# Patient Record
Sex: MALE | Race: WHITE | NOT HISPANIC OR LATINO | Employment: OTHER | URBAN - METROPOLITAN AREA
[De-identification: names, ages, dates, MRNs, and addresses within clinical notes are randomized per-mention and may not be internally consistent; named-entity substitution may affect disease eponyms.]

---

## 2017-06-20 ENCOUNTER — OFFICE VISIT (OUTPATIENT)
Dept: ORTHOPEDIC SURGERY | Facility: CLINIC | Age: 82
End: 2017-06-20

## 2017-06-20 VITALS — TEMPERATURE: 98.1 F | WEIGHT: 175.6 LBS | BODY MASS INDEX: 26.01 KG/M2 | HEIGHT: 69 IN

## 2017-06-20 DIAGNOSIS — M25.561 CHRONIC PAIN OF BOTH KNEES: Primary | ICD-10-CM

## 2017-06-20 DIAGNOSIS — M25.552 LEFT HIP PAIN: ICD-10-CM

## 2017-06-20 DIAGNOSIS — G89.29 CHRONIC PAIN OF BOTH KNEES: Primary | ICD-10-CM

## 2017-06-20 DIAGNOSIS — M25.562 CHRONIC PAIN OF BOTH KNEES: Primary | ICD-10-CM

## 2017-06-20 PROCEDURE — 73502 X-RAY EXAM HIP UNI 2-3 VIEWS: CPT | Performed by: ORTHOPAEDIC SURGERY

## 2017-06-20 PROCEDURE — 20610 DRAIN/INJ JOINT/BURSA W/O US: CPT | Performed by: ORTHOPAEDIC SURGERY

## 2017-06-20 PROCEDURE — 73562 X-RAY EXAM OF KNEE 3: CPT | Performed by: ORTHOPAEDIC SURGERY

## 2017-06-20 PROCEDURE — 99203 OFFICE O/P NEW LOW 30 MIN: CPT | Performed by: ORTHOPAEDIC SURGERY

## 2017-06-20 RX ORDER — BUPIVACAINE HYDROCHLORIDE 5 MG/ML
2 INJECTION, SOLUTION PERINEURAL
Status: COMPLETED | OUTPATIENT
Start: 2017-06-20 | End: 2017-06-20

## 2017-06-20 RX ORDER — LIDOCAINE HYDROCHLORIDE 10 MG/ML
4 INJECTION, SOLUTION INFILTRATION; PERINEURAL
Status: COMPLETED | OUTPATIENT
Start: 2017-06-20 | End: 2017-06-20

## 2017-06-20 RX ORDER — AMILORIDE HYDROCHLORIDE AND HYDROCHLOROTHIAZIDE 5; 50 MG/1; MG/1
1 TABLET ORAL
COMMUNITY

## 2017-06-20 RX ORDER — POTASSIUM CHLORIDE 750 MG/1
10 TABLET, FILM COATED, EXTENDED RELEASE ORAL
COMMUNITY

## 2017-06-20 RX ORDER — METHYLPREDNISOLONE ACETATE 80 MG/ML
80 INJECTION, SUSPENSION INTRA-ARTICULAR; INTRALESIONAL; INTRAMUSCULAR; SOFT TISSUE
Status: COMPLETED | OUTPATIENT
Start: 2017-06-20 | End: 2017-06-20

## 2017-06-20 RX ADMIN — METHYLPREDNISOLONE ACETATE 80 MG: 80 INJECTION, SUSPENSION INTRA-ARTICULAR; INTRALESIONAL; INTRAMUSCULAR; SOFT TISSUE at 14:35

## 2017-06-20 RX ADMIN — LIDOCAINE HYDROCHLORIDE 4 ML: 10 INJECTION, SOLUTION INFILTRATION; PERINEURAL at 14:35

## 2017-06-20 RX ADMIN — BUPIVACAINE HYDROCHLORIDE 2 ML: 5 INJECTION, SOLUTION PERINEURAL at 14:05

## 2017-06-20 RX ADMIN — LIDOCAINE HYDROCHLORIDE 4 ML: 10 INJECTION, SOLUTION INFILTRATION; PERINEURAL at 14:05

## 2017-06-20 RX ADMIN — METHYLPREDNISOLONE ACETATE 80 MG: 80 INJECTION, SUSPENSION INTRA-ARTICULAR; INTRALESIONAL; INTRAMUSCULAR; SOFT TISSUE at 14:05

## 2017-06-20 RX ADMIN — BUPIVACAINE HYDROCHLORIDE 2 ML: 5 INJECTION, SOLUTION PERINEURAL at 14:35

## 2017-06-20 NOTE — PROGRESS NOTES
"Patient: Dayton Woo  YOB: 1929 88 y.o. male  Medical Record Number: [id number]    Chief Complaints:   Chief Complaint   Patient presents with   • Left Knee - Pain   • Right Knee - Pain   • Left Hip - Pain       History of Present Illness:Dayton Woo is a 88 y.o. male who presents with Bilateral knee pain.  He describes a moderate to moderately severe ache.  It's worse with weightbearing.  It has been ongoing for years but much worse over the last year or so. Mild left groin pain.     Allergies: No Known Allergies    Medications:   Current Outpatient Prescriptions   Medication Sig Dispense Refill   • aMILoride-hydrochlorothiazide (MODURETIC) 5-50 MG per tablet Take 1 tablet by mouth.     • potassium chloride (K-DUR) 10 MEQ CR tablet Take 10 mEq by mouth.       No current facility-administered medications for this visit.          The following portions of the patient's history were reviewed and updated as appropriate: allergies, current medications, past family history, past medical history, past social history, past surgical history and problem list.    Review of Systems:   A 14 point review of systems was performed. All systems negative except pertinent positives/negative listed in HPI above    Physical Exam:   Vitals:    06/20/17 1351   Temp: 98.1 °F (36.7 °C)   Weight: 175 lb 9.6 oz (79.7 kg)   Height: 69\" (175.3 cm)       General: A and O x 3, ASA, NAD    SCLERA:    Normal    DENTITION:   Normal  Knee:  bilateral    ALIGNMENT:     Valgus      GAIT:    Antalgic    SKIN:    No abnormality    RANGE OF MOTION:   3  -  120   DEG    STRENGTH:   4  / 5    LIGAMENTS:    No varus / valgus instability.   Negative  Lachman.    MENISCUS:     Negative   Marin       DISTAL PULSES:    Paplable    DISTAL SENSATION :   Intact    LYMPHATICS:     No   lymphadenopathy    OTHER:          - Positive   effusion      - Crepitance with ROM        Radiology:  Xrays 3views both knees (ap,lateral, sunrise) were " ordered and reviewed for evaluation of knee pain demonstrating advanced valgus osteoarthritis with bone on bone articulation, subchondral cysts, and periarticular osteophytes. There are no previous films for comparision.     x-rays 2 views AP and lateral of the left hip were ordered and reviewed for evaluation f hp pain which show mild arthritic change.  There are no previous films or comparison    Assessment/Plan: Bilateral knee osteoarthritis.  He and I both would like to avoid surgery.  We will try with conservative measures.  I injected both knees as below.  Back on a when necessary basis.  I will send him to outpatient physical therapist.  Mild left hip oteoarthritis we'll continu tomonitor  Large Joint Arthrocentesis  Date/Time: 6/20/2017 2:05 PM  Consent given by: patient  Site marked: site marked  Timeout: Immediately prior to procedure a time out was called to verify the correct patient, procedure, equipment, support staff and site/side marked as required   Supporting Documentation  Indications: pain and joint swelling   Procedure Details  Location: knee - R knee  Preparation: Patient was prepped and draped in the usual sterile fashion  Needle size: 18 G  Approach: anterolateral  Medications administered: 4 mL lidocaine 1 %; 80 mg methylPREDNISolone acetate 80 MG/ML; 2 mL bupivacaine      Large Joint Arthrocentesis  Date/Time: 6/20/2017 2:35 PM  Consent given by: patient  Site marked: site marked  Timeout: Immediately prior to procedure a time out was called to verify the correct patient, procedure, equipment, support staff and site/side marked as required   Supporting Documentation  Indications: pain and joint swelling   Procedure Details  Location: knee - L knee  Preparation: Patient was prepped and draped in the usual sterile fashion  Needle size: 18 G  Approach: anterolateral  Medications administered: 2 mL bupivacaine; 4 mL lidocaine 1 %; 80 mg methylPREDNISolone acetate 80 MG/ML                Bj BUSTOS  MD Jamshid  6/20/2017

## 2019-01-08 ENCOUNTER — OFFICE VISIT (OUTPATIENT)
Dept: ORTHOPEDIC SURGERY | Facility: CLINIC | Age: 84
End: 2019-01-08

## 2019-01-08 VITALS — HEIGHT: 69 IN | WEIGHT: 178 LBS | TEMPERATURE: 97.6 F | BODY MASS INDEX: 26.36 KG/M2

## 2019-01-08 DIAGNOSIS — G89.29 CHRONIC PAIN OF BOTH KNEES: Primary | ICD-10-CM

## 2019-01-08 DIAGNOSIS — M25.562 CHRONIC PAIN OF BOTH KNEES: Primary | ICD-10-CM

## 2019-01-08 DIAGNOSIS — M25.561 CHRONIC PAIN OF BOTH KNEES: Primary | ICD-10-CM

## 2019-01-08 PROCEDURE — 73562 X-RAY EXAM OF KNEE 3: CPT | Performed by: ORTHOPAEDIC SURGERY

## 2019-01-08 PROCEDURE — 99213 OFFICE O/P EST LOW 20 MIN: CPT | Performed by: ORTHOPAEDIC SURGERY

## 2019-01-08 NOTE — PROGRESS NOTES
"Patient: Dayton Woo  YOB: 1929 89 y.o. male  Medical Record Number: [id number]    Chief Complaints:   Chief Complaint   Patient presents with   • Right Knee - Follow-up       History of Present Illness:Dayton Woo is a 89 y.o. male who presents for follow-up of  right knee.  He has known advanced arthritis he doesn't complain of much pain but he does have occasional feelings of instability.  He says it is not that bad.  He had injections 3 years or so ago.  And overall it helped for a short period of time.  He states his knee is not bad but is here with his wife he says he is very limited in his ambulatory ability.  He has no evidence of dementia that he or she are aware of  Allergies:   Allergies   Allergen Reactions   • Ciprofloxacin Unknown (See Comments)   • Sulfa Antibiotics Unknown (See Comments)       Medications:   Current Outpatient Medications   Medication Sig Dispense Refill   • potassium chloride (K-DUR) 10 MEQ CR tablet Take 10 mEq by mouth.     • aMILoride-hydrochlorothiazide (MODURETIC) 5-50 MG per tablet Take 1 tablet by mouth.       No current facility-administered medications for this visit.          The following portions of the patient's history were reviewed and updated as appropriate: allergies, current medications, past family history, past medical history, past social history, past surgical history and problem list.    Review of Systems:   A 14 point review of systems was performed. All systems negative except pertinent positives/negative listed in HPI above    Physical Exam:   Vitals:    01/08/19 1329   Temp: 97.6 °F (36.4 °C)   TempSrc: Temporal   Weight: 80.7 kg (178 lb)   Height: 175.3 cm (69\")       General: A and O x 3, ASA, NAD    SCLERA:    Normal    DENTITION:   Normal  Knee:  right    ALIGNMENT:     Valgus      GAIT:    Antalgic    SKIN:    No abnormality    RANGE OF MOTION:   4  -  115   DEG    STRENGTH:   4  / 5    LIGAMENTS:    No varus / valgus instability. "   Negative  Lachman.    MENISCUS:     Negative   Marin       DISTAL PULSES:    Paplable    DISTAL SENSATION :   Intact    LYMPHATICS:     No   lymphadenopathy    OTHER:          - Positive   effusion      - Crepitance with ROM     Radiology:  Xrays 3views both knees  (ap,lateral, sunrise) were ordered and reviewed for evaluation of knee pain demonstratingadvanced valgus osteoarthritis with bone on bone articulation, subchondral cysts, and periarticular osteophytes  todays xrays were compared to previous xrays and show new findings as described above    Assessment/Plan:  Advanced right great and left knee valgus osteoarthritis pain is not limiting him at this point.  We discussed options including physical therapy cortisone or gel injections but this point he would like to hold off for recommended continued quad strengthening exercises.  I'll see him back as needed

## 2019-01-08 NOTE — PROGRESS NOTES
"Patient: Dayton Woo  YOB: 1929 89 y.o. male  Medical Record Number: [id number]    Chief Complaints:   Chief Complaint   Patient presents with   • Right Knee - Follow-up       History of Present Illness:Dayton Woo is a 89 y.o. male who presents for follow-up of  both knees.  He has severe advanced right greater than left end-stage valgus osteoarthritis is not having pain.  He's here with his wife states he mostly has issues with instability from time to time.  He works out on a recumbent bike daily.  He has done physical therapy in the past.  Shots in the past been fairly short-lived.    Allergies:   Allergies   Allergen Reactions   • Ciprofloxacin Unknown (See Comments)   • Sulfa Antibiotics Unknown (See Comments)       Medications:   Current Outpatient Medications   Medication Sig Dispense Refill   • potassium chloride (K-DUR) 10 MEQ CR tablet Take 10 mEq by mouth.     • aMILoride-hydrochlorothiazide (MODURETIC) 5-50 MG per tablet Take 1 tablet by mouth.       No current facility-administered medications for this visit.          The following portions of the patient's history were reviewed and updated as appropriate: allergies, current medications, past family history, past medical history, past social history, past surgical history and problem list.    Review of Systems:   A 14 point review of systems was performed. All systems negative except pertinent positives/negative listed in HPI above    Physical Exam:   Vitals:    01/08/19 1329   Temp: 97.6 °F (36.4 °C)   TempSrc: Temporal   Weight: 80.7 kg (178 lb)   Height: 175.3 cm (69\")       General: A and O x 3, ASA, NAD    SCLERA:    Normal    DENTITION:   Normal  Knee :  right    ALIGNMENT:     Valgus      GAIT:    Antalgic    SKIN:    No abnormality, multiple healed incisions    RANGE OF MOTION:   0  -  120   DEG    STRENGTH:   4  / 5    LIGAMENTS:    No varus / valgus instability.   Negative  Lachman.    MENISCUS:     Negative   Marin   "     DISTAL PULSES:    Paplable    DISTAL SENSATION :   Intact    LYMPHATICS:     No   lymphadenopathy    OTHER:          - no   effusion      - Crepitance with ROM     Radiology:  Xrays 3views right knee (ap,lateral, sunrise) were ordered and reviewed for evaluation of knee pain demonstratingadvanced valgus osteoarthritis with bone on bone articulation, subchondral cysts, and periarticular osteophytes  todays xrays were compared to previous xrays and demonstrate no change    Assessment/Plan:  Advanced end-stage right knee osteoarthritis.  Given his age is not a great candidate for surgery.  He's not having much pain several recommended quad strengthening walking for exercise with cane if pain worsens he'll call back and we could consider either Synvisc or steroid injections.